# Patient Record
Sex: MALE | Race: WHITE | NOT HISPANIC OR LATINO | URBAN - METROPOLITAN AREA
[De-identification: names, ages, dates, MRNs, and addresses within clinical notes are randomized per-mention and may not be internally consistent; named-entity substitution may affect disease eponyms.]

---

## 2024-05-02 ENCOUNTER — OFFICE VISIT (OUTPATIENT)
Dept: URGENT CARE | Facility: CLINIC | Age: 24
End: 2024-05-02
Payer: COMMERCIAL

## 2024-05-02 VITALS
RESPIRATION RATE: 16 BRPM | TEMPERATURE: 98 F | WEIGHT: 170 LBS | HEIGHT: 72 IN | BODY MASS INDEX: 23.03 KG/M2 | DIASTOLIC BLOOD PRESSURE: 82 MMHG | OXYGEN SATURATION: 99 % | HEART RATE: 66 BPM | SYSTOLIC BLOOD PRESSURE: 131 MMHG

## 2024-05-02 DIAGNOSIS — L03.011 CELLULITIS OF RIGHT INDEX FINGER: Primary | ICD-10-CM

## 2024-05-02 DIAGNOSIS — S61.238A PUNCTURE WOUND OF INDEX FINGER, INITIAL ENCOUNTER: ICD-10-CM

## 2024-05-02 PROCEDURE — 90714 TD VACC NO PRESV 7 YRS+ IM: CPT | Mod: S$GLB,,, | Performed by: NURSE PRACTITIONER

## 2024-05-02 PROCEDURE — 90471 IMMUNIZATION ADMIN: CPT | Mod: S$GLB,,, | Performed by: NURSE PRACTITIONER

## 2024-05-02 PROCEDURE — 99204 OFFICE O/P NEW MOD 45 MIN: CPT | Mod: 25,S$GLB,, | Performed by: NURSE PRACTITIONER

## 2024-05-02 RX ORDER — METHYLPREDNISOLONE 4 MG/1
TABLET ORAL
Qty: 21 EACH | Refills: 0 | Status: SHIPPED | OUTPATIENT
Start: 2024-05-03 | End: 2024-05-24

## 2024-05-02 RX ORDER — CEPHALEXIN 500 MG/1
500 CAPSULE ORAL EVERY 12 HOURS
Qty: 14 CAPSULE | Refills: 0 | Status: SHIPPED | OUTPATIENT
Start: 2024-05-02 | End: 2024-05-09

## 2024-05-02 NOTE — PROGRESS NOTES
Subjective:      Patient ID: Vamshi Kamara is a 24 y.o. male.    Vitals:  height is 6' (1.829 m) and weight is 77.1 kg (170 lb). His temperature is 97.9 °F (36.6 °C). His blood pressure is 131/82 and his pulse is 66. His respiration is 16 and oxygen saturation is 99%.     Chief Complaint: Hand Pain    Patient presents to clinic with complaints of right index finger swelling and pain.  Patient reports that he was washing dishes yesterday and cut anterior mid finger on knife.  States that knife caused puncture wound that has healed, however today awoke with redness and swelling to area.  Denies fever.  States that his tetanus shot is not up-to-date.    Hand Pain   The incident occurred more than 1 week ago (x 1 week). The incident occurred at home. Injury mechanism: cut finger while washing dishes. The pain is present in the right fingers (2nd digit). The pain is mild. Pertinent negatives include no chest pain or numbness. Associated symptoms comments: swelling. He has tried nothing for the symptoms.       Constitution: Negative for activity change, appetite change, chills, fatigue, fever, unexpected weight change and generalized weakness.   HENT:  Negative for ear pain, ear discharge, foreign body in ear, tinnitus, hearing loss, dental problem, mouth sores, tongue pain, facial swelling, congestion, postnasal drip, sinus pain, sinus pressure, sore throat, trouble swallowing and voice change.    Neck: Negative for neck pain, neck stiffness and painful lymph nodes.   Cardiovascular:  Negative for chest pain, leg swelling, palpitations and sob on exertion.   Eyes:  Negative for eye trauma, eye discharge, eye itching, eye pain, eye redness, vision loss and eyelid swelling.   Respiratory:  Negative for chest tightness, cough, sputum production, COPD, shortness of breath, wheezing and asthma.    Gastrointestinal:  Negative for abdominal pain, nausea, vomiting, constipation, diarrhea, bright red blood in stool and dark  colored stools.   Endocrine: hair loss, cold intolerance and heat intolerance.   Genitourinary:  Negative for dysuria, frequency, urgency, flank pain and hematuria.   Musculoskeletal:  Negative for pain, trauma, joint pain, joint swelling, abnormal ROM of joint, back pain and muscle cramps.   Skin:  Positive for color change, wound, puncture wound and erythema. Negative for pale, rash and hives.   Allergic/Immunologic: Negative for environmental allergies, seasonal allergies, food allergies, asthma, hives and itching.   Neurological:  Negative for dizziness, history of vertigo, light-headedness, facial drooping, speech difficulty, headaches, disorientation, altered mental status, loss of consciousness and numbness.   Hematologic/Lymphatic: Negative for swollen lymph nodes and easy bruising/bleeding. Does not bruise/bleed easily.   Psychiatric/Behavioral:  Negative for altered mental status, disorientation, confusion, agitation, sleep disturbance and hallucinations.       Objective:     Physical Exam   Constitutional: He is oriented to person, place, and time. He appears well-developed. He is cooperative.   HENT:   Head: Normocephalic and atraumatic.   Ears:   Right Ear: Hearing, tympanic membrane, external ear and ear canal normal.   Left Ear: Hearing, tympanic membrane, external ear and ear canal normal.   Nose: Nose normal. No mucosal edema or nasal deformity. No epistaxis. Right sinus exhibits no maxillary sinus tenderness and no frontal sinus tenderness. Left sinus exhibits no maxillary sinus tenderness and no frontal sinus tenderness.   Mouth/Throat: Uvula is midline, oropharynx is clear and moist and mucous membranes are normal. No trismus in the jaw. Normal dentition. No uvula swelling.   Eyes: Conjunctivae and lids are normal.   Neck: Trachea normal and phonation normal. Neck supple.   Cardiovascular: Normal rate, regular rhythm, normal heart sounds and normal pulses.   Pulmonary/Chest: Effort normal and  breath sounds normal.   Abdominal: Normal appearance and bowel sounds are normal. Soft.   Musculoskeletal: Normal range of motion.         General: Normal range of motion.      Right hand: Right index finger: Exhibits swelling and tenderness.   Neurological: He is alert and oriented to person, place, and time. He exhibits normal muscle tone.   Skin: Skin is warm, dry and intact. erythema   Psychiatric: His speech is normal and behavior is normal. Judgment and thought content normal.   Nursing note and vitals reviewed.      Assessment:     1. Cellulitis of right index finger    2. Puncture wound of index finger, initial encounter        Plan:       Cellulitis of right index finger  -     cephALEXin (KEFLEX) 500 MG capsule; Take 1 capsule (500 mg total) by mouth every 12 (twelve) hours. for 7 days  Dispense: 14 capsule; Refill: 0  -     methylPREDNISolone (MEDROL DOSEPACK) 4 mg tablet; use as directed  Dispense: 21 each; Refill: 0  -     (In Office Administered) Td Vaccine    Puncture wound of index finger, initial encounter    Puncture wound scabbed over.  Wash area 3 times a day with soap and water.    Keep clean and dry.    Utilize over-the-counter Tylenol or Motrin as directed for fever.    Ensure adequate fluid intake with electrolytes.    Return to clinic for new or worsening symptoms.  Patient is recommended to follow-up with their PCP post discharge.    Total time spent on med rec, H&P, with over half of the time in direct patient care: 32 minutes       Additional MDM:     Heart Failure Score:   COPD = No